# Patient Record
(demographics unavailable — no encounter records)

---

## 2025-06-26 NOTE — ASSESSMENT
[FreeTextEntry1] : 48F with symptomatic supraumbilical incisional hernia without incarceration or strangulation. Also with severe lower back pain radiating to thigh b/l, L>R, which is limiting mobility.  Option for surgical repair discussed including risks of recurrence of the hernia, pain, infection. Will get MRI of lower back to better evaluate and r/o disc compression or herniation prior to any surgical intervention for abdominal hernia.

## 2025-06-26 NOTE — REVIEW OF SYSTEMS
[Fever] : no fever [Abdominal Pain] : abdominal pain [Constipation] : no constipation [Diarrhea] : no diarrhea [As Noted in HPI] : as noted in HPI [Limb Pain] : limb pain

## 2025-06-26 NOTE — PHYSICAL EXAM
[Abdominal Masses] : No abdominal masses [de-identified] : NAD [de-identified] : Anicteric [de-identified] : soft, nontender, nondistended. Reducible supraumbilical incisional hernia. Defect ~3cm, no skin changes [de-identified] : No edema b/l LE

## 2025-06-26 NOTE — HISTORY OF PRESENT ILLNESS
[de-identified] : 48F with symptomatic incisional hernia for evaluation for repair. Patient s/p robotic SMILEY in 2022 for bleeding fibroids and subsequently developed hernia at the supraumbilical extraction site. +pain and occasional nausea, denies any fevers, vomiting, constipation, or prior bowel obstruction. Patient also c/o severe lower back pain with radiation to thighs b/l, L>R  PMH: GERD PSH: robotic SMILEY Medications: Pantoprazole, MVI Allergies: PCN, Bactrim, cephalosporin, ASA Social: Denies tob, etoh, drugs